# Patient Record
Sex: MALE | Race: WHITE | Employment: OTHER | URBAN - METROPOLITAN AREA
[De-identification: names, ages, dates, MRNs, and addresses within clinical notes are randomized per-mention and may not be internally consistent; named-entity substitution may affect disease eponyms.]

---

## 2019-12-04 ENCOUNTER — HOSPITAL ENCOUNTER (OUTPATIENT)
Dept: MRI IMAGING | Age: 64
Discharge: HOME | End: 2019-12-04
Payer: MEDICARE

## 2019-12-04 DIAGNOSIS — M75.101 ROTATOR CUFF SYNDROME OF RIGHT SHOULDER: ICD-10-CM

## 2019-12-04 PROCEDURE — 73221 MRI JOINT UPR EXTREM W/O DYE: CPT

## 2019-12-31 ENCOUNTER — HOSPITAL ENCOUNTER (OUTPATIENT)
Dept: PREADMISSION TESTING | Age: 64
Discharge: HOME | End: 2019-12-31
Payer: MEDICARE

## 2019-12-31 VITALS
WEIGHT: 171.25 LBS | HEART RATE: 50 BPM | SYSTOLIC BLOOD PRESSURE: 98 MMHG | TEMPERATURE: 97.8 F | DIASTOLIC BLOOD PRESSURE: 61 MMHG | HEIGHT: 65 IN | BODY MASS INDEX: 28.53 KG/M2 | RESPIRATION RATE: 18 BRPM

## 2019-12-31 LAB
ATRIAL RATE: 49 BPM
CALCULATED P AXIS, ECG09: 38 DEGREES
CALCULATED R AXIS, ECG10: 0 DEGREES
CALCULATED T AXIS, ECG11: 1 DEGREES
DIAGNOSIS, 93000: NORMAL
HGB BLD-MCNC: 14.4 G/DL (ref 12.1–17)
P-R INTERVAL, ECG05: 178 MS
Q-T INTERVAL, ECG07: 404 MS
QRS DURATION, ECG06: 82 MS
QTC CALCULATION (BEZET), ECG08: 364 MS
VENTRICULAR RATE, ECG03: 49 BPM

## 2019-12-31 PROCEDURE — 85018 HEMOGLOBIN: CPT

## 2019-12-31 PROCEDURE — 36415 COLL VENOUS BLD VENIPUNCTURE: CPT

## 2019-12-31 PROCEDURE — 93005 ELECTROCARDIOGRAM TRACING: CPT

## 2019-12-31 RX ORDER — GUAIFENESIN 100 MG/5ML
81 LIQUID (ML) ORAL
COMMUNITY

## 2019-12-31 RX ORDER — ATORVASTATIN CALCIUM 80 MG/1
80 TABLET, FILM COATED ORAL
COMMUNITY

## 2019-12-31 RX ORDER — METOPROLOL TARTRATE 100 MG/1
75 TABLET ORAL 2 TIMES DAILY
COMMUNITY

## 2019-12-31 NOTE — PERIOP NOTES
Preoperative instructions reviewed with patient. Patient given  2 bottles of CHG soap. Instructions reviewed on use of CHG soap. Patient given SSI infection FAQS sheet,   Patient was given the opportunity to ask questions on the information provided.

## 2020-01-08 ENCOUNTER — ANESTHESIA EVENT (OUTPATIENT)
Dept: SURGERY | Age: 65
End: 2020-01-08
Payer: MEDICARE

## 2020-01-08 NOTE — H&P
Subjective:   Patient ID: Guanako Puri is a 75 y.o. male. Pain Score: Data Unavailable  Chief Complaint: Follow-up of the Right Shoulder        HPI: Guanako Puri is a 75 y.o. male who returns for follow-up of his right shoulder pain. During his last visit with our office on 10/1/19, he reported he was tugging the pull cord of his lawnmower 7/2019 and felt a \"tweak\" in his right shoulder. He noted right shoulder pain that kept him awake at night. He had pain with rotator cuff strength testing on physical exam.  X-rays of the right shoulder ordered during his last visit with out office showed no significant degenerative changes. We administered a cortisone injection to his right subacromial bursa and recommended rotator cuff strengthening exercises when his pain subsided. We also discussed the option of an MRI of his right shoulder if he remained symptomatic. THe returns today to discuss the results. He reports temporary relief from the cortisone injection for approximately 1 week, however, his right shoulder pain returned.   He notes his pain was exacerbated by performing his rotator cuff strengthening exercises.          Medical History        Past Medical History:   Diagnosis Date    Coronary artery disease           Surgical History   Past Surgical History:   Procedure Laterality Date    ANGIOPLASTY        NO RELEVANT ORTHOPAEDIC SURGERIES                   Family History   Problem Relation Age of Onset    Coronary artery disease Father      Coronary artery disease Mother      Coronary artery disease Brother        @[facility]@  Review of Systems   12/10/2019     Constitutional: Unexplained: Negative  Genitourinary: Frequent Urination: Negative  HEENT: Vision Loss: Negative  Neurological: Memory Loss: Negative  Integumentary: Rash: Negative  Cardiovascular: Palpatations: Negative  Hematologic: Bruises/Bleeds Easily: Negative  Gastrointestinal: Constipation: Negative  Immunological: Seasonal Allergies: Positive  Musculoskeletal: Joint Pain: Positive  Objective:      Vitals       Vitals:     12/10/19 0756   BP: 110/69   Pulse: 78   Weight: 160 lb   Height: 5' 5\"         Constitutional:  No acute distress. Well nourished. Well developed. Psychiatric: Alert and oriented x3. Skin:  No marked skin ulcers. Neurological:  No apparent deficits     Right shoulder shows normal range of motion. There are no skin changes, rashes, deformity, or bruising. He has pain and mild weakness with resisted thumb up isolation and pain with resisted thumb down isolation, but no weakness. He has pain with resisted external rotation, but no weakness. He has normal stability. He has good distal arm musculature. He has no edema. He has good pulses, good cap refill and good sensation distally.      Left shoulder shows normal range of motion. There are no skin changes, rashes, deformity, or bruising. He has full strength. He has normal stability. He has good distal arm musculature. He has no edema. He has good pulses, good cap refill and good sensation distally.        Radiographs:           Mri Shoulder Right Without Contrast ([id number])     Result Date: 2019  [facility] - MRI SHOULDER RT WO CONT Patient: Kadeem Dennis : 1955 Date of Service: 2019 8:30:45 AM Reason For Exam: Rotator cuff syndrome of right shoulder Ordering Provider: Felicia Farooq Physician: John Smith Signing Date: 2019 10:43:20 AM EXAM: MRI SHOULDER RT WO CONT INDICATION: Rotator cuff syndrome right shoulder COMPARISON: None TECHNIQUE: Axial proton density fat-saturated; oblique coronal T1, T2 fat-saturated, and proton density fat-saturated; and oblique sagittal T2 fat-saturated MRI of the right shoulder . CONTRAST: None. FINDINGS: A.C. joint: Moderate degeneration of the acromioclavicular joint with subacromial spurring Anterior acromion process type: 2-3 Bone marrow: Within normal limits.  No acute fracture, dislocation, or marrow replacing process. Joint fluid: There is fluid in the subacromial subdeltoid bursa and glenohumeral joint. Minimal synovitis within the axillary pouch and synovitis versus small loose bodies in the subscapularis joint recess. Small amount of fluid in the subcoracoid bursa Rotator cuff tendons: Large full-thickness tear of the supraspinatus. The torn fibers are degenerated and have retracted approximately 2.2 cm. There is tendinopathy of the remaining supraspinatus and anterior infraspinatus with partial-thickness undersurface tearing. Teres minor is intact. There is focal high-grade partial-thickness undersurface tearing of the superior subscapularis Biceps tendon: Intact and located within the bicipital groove. Muscles: Mild atrophy of the supraspinatus without edema. Glenoid labrum: Intact. Glenohumeral joint capsule: within normal limits. Glenohumeral articular cartilage: Intact. No focal osteochondral lesion. Soft tissue mass: None. IMPRESSION: 1. Large full-thickness tear of the supraspinatus with 2.2 cm retraction and mild atrophy 2. Tendinopathy with partial-thickness undersurface tearing anterior infraspinatus and focal high-grade partial-thickness undersurface tearing superior subscapularis 3. Acromioclavicular degenerative change  Signing date/time: 12/4/2019 10:43 AM Signed by: Genevieve Willard V        Independently reviewed the MRI which shows evidence of a full-thickness rotator cuff tear of the supraspinatus tendon with some mild retraction. .  Assessment:      1. Traumatic complete tear of right rotator cuff, initial encounter    2. Impingement syndrome of right shoulder    3. Primary osteoarthritis of right shoulder       There is no problem list on file for this patient.     Plan:      I reviewed the results of the MRI ordered of the right shoulder with the patient. He has a full-thickness rotator cuff tear of the supraspinatus with 2.2 cm of retraction and mild atrophy.  There is also a focal full-thickness high grade partial thickness tear of the subscapularis and AC joint osteoarthritis. I discussed this diagnosis with the patient. We discussed treatment options including continued conservative management vs rotator cuff repair surgery as well as the pros and cons of each. We discussed rotator cuff surgery with the patient including the risks, benefits and possible complications. These include infection, bleeding, and failure to heal or resolve the patient's pain. We discussed the nature of the surgery including the need for general anesthesia, the usual use of a upper extremity block, and the use of arthroscopic equipment. We also discussed the possible need to make an open incision. He understands this and limitations with his arm at in the postop phase. We explained that he could not lift the arm for 6 weeks and after that would be restricted for another 6. He understands and wants to go forward. We will set him up for a right shoulder arthroscopy and rotator cuff repair with subacromial decompression and distal clavicle excision. We will also look at his intra-articular pathology and treat that accordingly. All the risks and benefits were discussed with him including infection and failure to heal and he wants to proceed with surgery. We will schedule surgery at his convenience. Follow-up for scheduled surgery.            Orders Placed This Encounter    BMI >=25 PATIENT INSTRUCTIONS & EDUCATION      Return in about 2 weeks (around 12/24/2019) for Scheduled surgery. Medical documentation was entered into the chart by me, Vu Lux, medical scribe for Dr. Boris Solorzano, Nakul Dey MD, personally, performed the services described in this documentation, as scribed in my presence, and it is both accurate and complete.   Scribed by: Vu Lux      Electronically signed by Nakul Dey MD at 12/10/2019  4:44 PM      Links     Previous Version       Office Visit on 12/10/2019 Revision History         Note shared with patient

## 2020-01-09 ENCOUNTER — ANESTHESIA (OUTPATIENT)
Dept: SURGERY | Age: 65
End: 2020-01-09
Payer: MEDICARE

## 2020-01-09 ENCOUNTER — HOSPITAL ENCOUNTER (OUTPATIENT)
Age: 65
Setting detail: OUTPATIENT SURGERY
Discharge: HOME | End: 2020-01-09
Attending: ORTHOPAEDIC SURGERY | Admitting: ORTHOPAEDIC SURGERY
Payer: MEDICARE

## 2020-01-09 VITALS
TEMPERATURE: 97.7 F | RESPIRATION RATE: 15 BRPM | SYSTOLIC BLOOD PRESSURE: 94 MMHG | DIASTOLIC BLOOD PRESSURE: 40 MMHG | OXYGEN SATURATION: 92 % | BODY MASS INDEX: 28.53 KG/M2 | WEIGHT: 171.25 LBS | HEIGHT: 65 IN | HEART RATE: 60 BPM

## 2020-01-09 DIAGNOSIS — M75.101 ROTATOR CUFF TEAR ARTHROPATHY OF RIGHT SHOULDER: Primary | ICD-10-CM

## 2020-01-09 DIAGNOSIS — M12.811 ROTATOR CUFF TEAR ARTHROPATHY OF RIGHT SHOULDER: Primary | ICD-10-CM

## 2020-01-09 PROCEDURE — 77030003601 HC NDL NRV BLK BBMI -A

## 2020-01-09 PROCEDURE — 77030006988: Performed by: ORTHOPAEDIC SURGERY

## 2020-01-09 PROCEDURE — 76210000000 HC OR PH I REC 2 TO 2.5 HR: Performed by: ORTHOPAEDIC SURGERY

## 2020-01-09 PROCEDURE — 77030011390 HC GRSP SUT IDL J&J -C: Performed by: ORTHOPAEDIC SURGERY

## 2020-01-09 PROCEDURE — 76010000131 HC OR TIME 2 TO 2.5 HR: Performed by: ORTHOPAEDIC SURGERY

## 2020-01-09 PROCEDURE — 74011250636 HC RX REV CODE- 250/636: Performed by: NURSE ANESTHETIST, CERTIFIED REGISTERED

## 2020-01-09 PROCEDURE — 76210000021 HC REC RM PH II 0.5 TO 1 HR: Performed by: ORTHOPAEDIC SURGERY

## 2020-01-09 PROCEDURE — 77030016678 HC BUR SHV4 S&N -B: Performed by: ORTHOPAEDIC SURGERY

## 2020-01-09 PROCEDURE — 77030040922 HC BLNKT HYPOTHRM STRY -A

## 2020-01-09 PROCEDURE — 77030037717 HC BIT DRL SUT TAK KT -ARTH -D: Performed by: ORTHOPAEDIC SURGERY

## 2020-01-09 PROCEDURE — 74011250636 HC RX REV CODE- 250/636: Performed by: ANESTHESIOLOGY

## 2020-01-09 PROCEDURE — 76060000035 HC ANESTHESIA 2 TO 2.5 HR: Performed by: ORTHOPAEDIC SURGERY

## 2020-01-09 PROCEDURE — 77030026438 HC STYL ET INTUB CARD -A: Performed by: NURSE ANESTHETIST, CERTIFIED REGISTERED

## 2020-01-09 PROCEDURE — C1713 ANCHOR/SCREW BN/BN,TIS/BN: HCPCS | Performed by: ORTHOPAEDIC SURGERY

## 2020-01-09 PROCEDURE — 74011000250 HC RX REV CODE- 250: Performed by: NURSE ANESTHETIST, CERTIFIED REGISTERED

## 2020-01-09 PROCEDURE — 74011000250 HC RX REV CODE- 250: Performed by: ANESTHESIOLOGY

## 2020-01-09 PROCEDURE — 77030018835 HC SOL IRR LR ICUM -A: Performed by: ORTHOPAEDIC SURGERY

## 2020-01-09 PROCEDURE — 77030019927 HC TBNG IRR CYSTO BAXT -A: Performed by: ORTHOPAEDIC SURGERY

## 2020-01-09 PROCEDURE — 77030008684 HC TU ET CUF COVD -B: Performed by: NURSE ANESTHETIST, CERTIFIED REGISTERED

## 2020-01-09 PROCEDURE — 77030002916 HC SUT ETHLN J&J -A: Performed by: ORTHOPAEDIC SURGERY

## 2020-01-09 PROCEDURE — 74011250636 HC RX REV CODE- 250/636: Performed by: PHYSICIAN ASSISTANT

## 2020-01-09 PROCEDURE — 77030006884 HC BLD SHV INCIS S&N -B: Performed by: ORTHOPAEDIC SURGERY

## 2020-01-09 RX ORDER — SODIUM CHLORIDE 0.9 % (FLUSH) 0.9 %
5-40 SYRINGE (ML) INJECTION AS NEEDED
Status: DISCONTINUED | OUTPATIENT
Start: 2020-01-09 | End: 2020-01-09 | Stop reason: HOSPADM

## 2020-01-09 RX ORDER — MORPHINE SULFATE 10 MG/ML
2 INJECTION, SOLUTION INTRAMUSCULAR; INTRAVENOUS
Status: DISCONTINUED | OUTPATIENT
Start: 2020-01-09 | End: 2020-01-09 | Stop reason: HOSPADM

## 2020-01-09 RX ORDER — FENTANYL CITRATE 50 UG/ML
INJECTION, SOLUTION INTRAMUSCULAR; INTRAVENOUS AS NEEDED
Status: DISCONTINUED | OUTPATIENT
Start: 2020-01-09 | End: 2020-01-09 | Stop reason: HOSPADM

## 2020-01-09 RX ORDER — ROPIVACAINE HYDROCHLORIDE 5 MG/ML
30 INJECTION, SOLUTION EPIDURAL; INFILTRATION; PERINEURAL AS NEEDED
Status: DISCONTINUED | OUTPATIENT
Start: 2020-01-09 | End: 2020-01-09 | Stop reason: HOSPADM

## 2020-01-09 RX ORDER — FENTANYL CITRATE 50 UG/ML
25 INJECTION, SOLUTION INTRAMUSCULAR; INTRAVENOUS
Status: DISCONTINUED | OUTPATIENT
Start: 2020-01-09 | End: 2020-01-09 | Stop reason: HOSPADM

## 2020-01-09 RX ORDER — SODIUM CHLORIDE 0.9 % (FLUSH) 0.9 %
5-40 SYRINGE (ML) INJECTION EVERY 8 HOURS
Status: DISCONTINUED | OUTPATIENT
Start: 2020-01-09 | End: 2020-01-09 | Stop reason: HOSPADM

## 2020-01-09 RX ORDER — DEXAMETHASONE SODIUM PHOSPHATE 4 MG/ML
INJECTION, SOLUTION INTRA-ARTICULAR; INTRALESIONAL; INTRAMUSCULAR; INTRAVENOUS; SOFT TISSUE AS NEEDED
Status: DISCONTINUED | OUTPATIENT
Start: 2020-01-09 | End: 2020-01-09 | Stop reason: HOSPADM

## 2020-01-09 RX ORDER — SODIUM CHLORIDE, SODIUM LACTATE, POTASSIUM CHLORIDE, CALCIUM CHLORIDE 600; 310; 30; 20 MG/100ML; MG/100ML; MG/100ML; MG/100ML
INJECTION, SOLUTION INTRAVENOUS
Status: DISCONTINUED | OUTPATIENT
Start: 2020-01-09 | End: 2020-01-09 | Stop reason: HOSPADM

## 2020-01-09 RX ORDER — SODIUM CHLORIDE 9 MG/ML
INJECTION INTRAMUSCULAR; INTRAVENOUS; SUBCUTANEOUS
Status: DISCONTINUED
Start: 2020-01-09 | End: 2020-01-09 | Stop reason: HOSPADM

## 2020-01-09 RX ORDER — HYDROMORPHONE HYDROCHLORIDE 1 MG/ML
0.2 INJECTION, SOLUTION INTRAMUSCULAR; INTRAVENOUS; SUBCUTANEOUS
Status: DISCONTINUED | OUTPATIENT
Start: 2020-01-09 | End: 2020-01-09 | Stop reason: HOSPADM

## 2020-01-09 RX ORDER — EPHEDRINE SULFATE/0.9% NACL/PF 50 MG/5 ML
SYRINGE (ML) INTRAVENOUS AS NEEDED
Status: DISCONTINUED | OUTPATIENT
Start: 2020-01-09 | End: 2020-01-09 | Stop reason: HOSPADM

## 2020-01-09 RX ORDER — FENTANYL CITRATE 50 UG/ML
50 INJECTION, SOLUTION INTRAMUSCULAR; INTRAVENOUS AS NEEDED
Status: DISCONTINUED | OUTPATIENT
Start: 2020-01-09 | End: 2020-01-09 | Stop reason: HOSPADM

## 2020-01-09 RX ORDER — PROCHLORPERAZINE EDISYLATE 5 MG/ML
INJECTION INTRAMUSCULAR; INTRAVENOUS
Status: DISCONTINUED
Start: 2020-01-09 | End: 2020-01-09 | Stop reason: HOSPADM

## 2020-01-09 RX ORDER — ONDANSETRON 2 MG/ML
INJECTION INTRAMUSCULAR; INTRAVENOUS AS NEEDED
Status: DISCONTINUED | OUTPATIENT
Start: 2020-01-09 | End: 2020-01-09 | Stop reason: HOSPADM

## 2020-01-09 RX ORDER — GLYCOPYRROLATE 0.2 MG/ML
INJECTION INTRAMUSCULAR; INTRAVENOUS AS NEEDED
Status: DISCONTINUED | OUTPATIENT
Start: 2020-01-09 | End: 2020-01-09 | Stop reason: HOSPADM

## 2020-01-09 RX ORDER — ROPIVACAINE HYDROCHLORIDE 5 MG/ML
INJECTION, SOLUTION EPIDURAL; INFILTRATION; PERINEURAL
Status: COMPLETED | OUTPATIENT
Start: 2020-01-09 | End: 2020-01-09

## 2020-01-09 RX ORDER — SODIUM CHLORIDE, SODIUM LACTATE, POTASSIUM CHLORIDE, CALCIUM CHLORIDE 600; 310; 30; 20 MG/100ML; MG/100ML; MG/100ML; MG/100ML
50 INJECTION, SOLUTION INTRAVENOUS CONTINUOUS
Status: DISCONTINUED | OUTPATIENT
Start: 2020-01-09 | End: 2020-01-09 | Stop reason: HOSPADM

## 2020-01-09 RX ORDER — ROCURONIUM BROMIDE 10 MG/ML
INJECTION, SOLUTION INTRAVENOUS AS NEEDED
Status: DISCONTINUED | OUTPATIENT
Start: 2020-01-09 | End: 2020-01-09 | Stop reason: HOSPADM

## 2020-01-09 RX ORDER — LIDOCAINE HYDROCHLORIDE 10 MG/ML
0.1 INJECTION, SOLUTION EPIDURAL; INFILTRATION; INTRACAUDAL; PERINEURAL AS NEEDED
Status: DISCONTINUED | OUTPATIENT
Start: 2020-01-09 | End: 2020-01-09 | Stop reason: HOSPADM

## 2020-01-09 RX ORDER — LIDOCAINE HYDROCHLORIDE 20 MG/ML
INJECTION, SOLUTION EPIDURAL; INFILTRATION; INTRACAUDAL; PERINEURAL AS NEEDED
Status: DISCONTINUED | OUTPATIENT
Start: 2020-01-09 | End: 2020-01-09 | Stop reason: HOSPADM

## 2020-01-09 RX ORDER — MIDAZOLAM HYDROCHLORIDE 1 MG/ML
INJECTION, SOLUTION INTRAMUSCULAR; INTRAVENOUS AS NEEDED
Status: DISCONTINUED | OUTPATIENT
Start: 2020-01-09 | End: 2020-01-09 | Stop reason: HOSPADM

## 2020-01-09 RX ORDER — NEOSTIGMINE METHYLSULFATE 1 MG/ML
INJECTION INTRAVENOUS AS NEEDED
Status: DISCONTINUED | OUTPATIENT
Start: 2020-01-09 | End: 2020-01-09 | Stop reason: HOSPADM

## 2020-01-09 RX ORDER — MIDAZOLAM HYDROCHLORIDE 1 MG/ML
1 INJECTION, SOLUTION INTRAMUSCULAR; INTRAVENOUS AS NEEDED
Status: DISCONTINUED | OUTPATIENT
Start: 2020-01-09 | End: 2020-01-09 | Stop reason: HOSPADM

## 2020-01-09 RX ORDER — OXYCODONE AND ACETAMINOPHEN 5; 325 MG/1; MG/1
1 TABLET ORAL
Qty: 42 TAB | Refills: 0 | Status: SHIPPED | OUTPATIENT
Start: 2020-01-09 | End: 2020-01-23

## 2020-01-09 RX ORDER — CEFAZOLIN SODIUM/WATER 2 G/20 ML
2 SYRINGE (ML) INTRAVENOUS ONCE
Status: COMPLETED | OUTPATIENT
Start: 2020-01-09 | End: 2020-01-09

## 2020-01-09 RX ORDER — ONDANSETRON 2 MG/ML
4 INJECTION INTRAMUSCULAR; INTRAVENOUS AS NEEDED
Status: DISCONTINUED | OUTPATIENT
Start: 2020-01-09 | End: 2020-01-09 | Stop reason: HOSPADM

## 2020-01-09 RX ORDER — PROPOFOL 10 MG/ML
INJECTION, EMULSION INTRAVENOUS AS NEEDED
Status: DISCONTINUED | OUTPATIENT
Start: 2020-01-09 | End: 2020-01-09 | Stop reason: HOSPADM

## 2020-01-09 RX ORDER — DEXMEDETOMIDINE HYDROCHLORIDE 100 UG/ML
INJECTION, SOLUTION INTRAVENOUS AS NEEDED
Status: DISCONTINUED | OUTPATIENT
Start: 2020-01-09 | End: 2020-01-09 | Stop reason: HOSPADM

## 2020-01-09 RX ADMIN — Medication 10 MG: at 11:11

## 2020-01-09 RX ADMIN — FENTANYL CITRATE 100 MCG: 50 INJECTION, SOLUTION INTRAMUSCULAR; INTRAVENOUS at 09:36

## 2020-01-09 RX ADMIN — SODIUM CHLORIDE 5 MG: 9 INJECTION INTRAMUSCULAR; INTRAVENOUS; SUBCUTANEOUS at 14:33

## 2020-01-09 RX ADMIN — Medication 2000 MG: at 10:39

## 2020-01-09 RX ADMIN — ROCURONIUM BROMIDE 50 MG: 10 SOLUTION INTRAVENOUS at 10:31

## 2020-01-09 RX ADMIN — LIDOCAINE HYDROCHLORIDE 100 MG: 20 INJECTION, SOLUTION EPIDURAL; INFILTRATION; INTRACAUDAL; PERINEURAL at 10:31

## 2020-01-09 RX ADMIN — FENTANYL CITRATE 100 MCG: 50 INJECTION, SOLUTION INTRAMUSCULAR; INTRAVENOUS at 10:31

## 2020-01-09 RX ADMIN — SODIUM CHLORIDE, POTASSIUM CHLORIDE, SODIUM LACTATE AND CALCIUM CHLORIDE: 600; 310; 30; 20 INJECTION, SOLUTION INTRAVENOUS at 10:17

## 2020-01-09 RX ADMIN — NEOSTIGMINE METHYLSULFATE 3 MG: 1 INJECTION, SOLUTION INTRAMUSCULAR; INTRAVENOUS; SUBCUTANEOUS at 12:25

## 2020-01-09 RX ADMIN — DEXMEDETOMIDINE HYDROCHLORIDE 10 MCG: 100 INJECTION, SOLUTION, CONCENTRATE INTRAVENOUS at 10:54

## 2020-01-09 RX ADMIN — GLYCOPYRROLATE 0.6 MG: 0.2 INJECTION, SOLUTION INTRAMUSCULAR; INTRAVENOUS at 12:25

## 2020-01-09 RX ADMIN — ONDANSETRON 4 MG: 2 INJECTION INTRAMUSCULAR; INTRAVENOUS at 13:38

## 2020-01-09 RX ADMIN — ONDANSETRON HYDROCHLORIDE 4 MG: 2 INJECTION, SOLUTION INTRAMUSCULAR; INTRAVENOUS at 12:25

## 2020-01-09 RX ADMIN — ROPIVACAINE HYDROCHLORIDE 30 ML: 5 INJECTION, SOLUTION EPIDURAL; INFILTRATION; PERINEURAL at 09:27

## 2020-01-09 RX ADMIN — SODIUM CHLORIDE, SODIUM LACTATE, POTASSIUM CHLORIDE, AND CALCIUM CHLORIDE 50 ML/HR: 600; 310; 30; 20 INJECTION, SOLUTION INTRAVENOUS at 09:25

## 2020-01-09 RX ADMIN — Medication 10 MG: at 11:25

## 2020-01-09 RX ADMIN — DEXAMETHASONE SODIUM PHOSPHATE 8 MG: 4 INJECTION, SOLUTION INTRAMUSCULAR; INTRAVENOUS at 10:36

## 2020-01-09 RX ADMIN — PROPOFOL 150 MG: 10 INJECTION, EMULSION INTRAVENOUS at 10:31

## 2020-01-09 RX ADMIN — DEXMEDETOMIDINE HYDROCHLORIDE 1 MCG: 100 INJECTION, SOLUTION, CONCENTRATE INTRAVENOUS at 11:08

## 2020-01-09 RX ADMIN — MIDAZOLAM 2 MG: 1 INJECTION INTRAMUSCULAR; INTRAVENOUS at 10:26

## 2020-01-09 RX ADMIN — MIDAZOLAM 2 MG: 1 INJECTION INTRAMUSCULAR; INTRAVENOUS at 09:36

## 2020-01-09 NOTE — ANESTHESIA POSTPROCEDURE EVALUATION
Post-Anesthesia Evaluation and Assessment    Patient: Beatrice Juarez MRN: [id number]  SSN: [id number]    YOB: 1955  Age: 77 y.o. Sex: male      I have evaluated the patient and they are stable and ready for discharge from the PACU. Cardiovascular Function/Vital Signs  Visit Vitals  /50   Pulse 67   Temp 36.5 °C (97.7 °F)   Resp 15   Ht 5' 5\" (1.651 m)   Wt 77.7 kg (171 lb 4 oz)   SpO2 99%   BMI 28.50 kg/m²       Patient is status post General anesthesia for Procedure(s):  RIGHT SHOULDER ARTHROSCOPY, SUBACROMIAL DECOMPRESSION WITH DISTAL CLAVICLE EXCISION AND ROTATOR CUFF REPAIR. Nausea/Vomiting: None    Postoperative hydration reviewed and adequate. Pain:  Pain Scale 1: (P) Numeric (0 - 10) (01/09/20 1239)  Pain Intensity 1: (P) 0 (01/09/20 1239)   Managed    Neurological Status:   Neuro (WDL): Within Defined Limits (01/09/20 0902)  Neuro  LUE Motor Response: (P) Purposeful (01/09/20 1239)  LLE Motor Response: (P) Purposeful (01/09/20 1239)  RUE Motor Response: (P) Pharmocologically paralyzed (01/09/20 1239)  RLE Motor Response: (P) Purposeful (01/09/20 1239)   At baseline    Mental Status, Level of Consciousness: Alert and  oriented to person, place, and time    Pulmonary Status:   O2 Device: CO2 nasal cannula (01/09/20 1243)   Adequate oxygenation and airway patent    Complications related to anesthesia: None    Post-anesthesia assessment completed. No concerns    Signed By: Uche Shah MD     January 9, 2020              Procedure(s):  RIGHT SHOULDER ARTHROSCOPY, SUBACROMIAL DECOMPRESSION WITH DISTAL CLAVICLE EXCISION AND ROTATOR CUFF REPAIR. general, regional    <BSHSIANPOST>    Vitals Value Taken Time   /50 1/9/2020  1:00 PM   Temp 36.5 °C (97.7 °F) 1/9/2020 12:43 PM   Pulse 62 1/9/2020  1:06 PM   Resp 15 1/9/2020  1:06 PM   SpO2 97 % 1/9/2020  1:06 PM   Vitals shown include unvalidated device data.

## 2020-01-09 NOTE — ROUTINE PROCESS
Patient: Vanessa Hoang MRN: [id number]  SSN: [id number]   YOB: 1955  Age: 77 y.o. Sex: male     Patient is status post Procedure(s):  RIGHT SHOULDER ARTHROSCOPY, SUBACROMIAL DECOMPRESSION WITH DISTAL CLAVICLE EXCISION AND ROTATOR CUFF REPAIR. Surgeon(s) and Role:     Tay Sylvester MD (Jody) - Primary     * Collette Gaucher, DO - Fellow    Local/Dose/Irrigation:                    Peripheral IV 01/09/20 Left;Posterior Hand (Active)   Site Assessment Clean, dry, & intact 1/9/2020  9:48 AM   Phlebitis Assessment 0 1/9/2020  9:48 AM   Infiltration Assessment 0 1/9/2020  9:48 AM   Dressing Status Clean, dry, & intact; New 1/9/2020  9:48 AM   Dressing Type Tape;Transparent 1/9/2020  9:48 AM   Hub Color/Line Status Green; Infusing 1/9/2020  9:48 AM                           Dressing/Packing:  Wound Shoulder Right-Dressing Type: (4x4, abd and tape) (01/09/20 1100)    Splint/Cast: Wound Shoulder Right-Splint Type/Material: (shoulder immobilizer)]    Other:

## 2020-01-09 NOTE — DISCHARGE INSTRUCTIONS
[facility]    POST OPERATIVE INSTRUCTIONS  Rotator Cuff Repair - Shoulder  MD Gi Mckay PA-C           1. First meal at home should be clear liquids. Progress to regular diet as tolerated. 2. Apply an ice bag or use cold therapy device for 20-30 minutes 4 times a day for the first 10 days to reduce swelling and pain and then use as desired. 3. You may remove the bulky dressing 28 hours after surgery and at that time it is ok to shower. Dry incisions well after showering and  cover with Bandaids. 4. You will use a sling with a pillow until your first post-op visit. You may remove the sling to shower- keeping your arm down by your side, but shoulder         Wear the sling at all other times. 5. A recliner position is often more comfortable for sleeping the first several days/ weeks after surgery but you may sleep however you are most comfortable with the sling on. 6. Medication Instructions are listed below:          All narcotics can cause constipation, so please be aware and drink plenty of  water and take over the counter stool softner or Miralax. Oxycodone      7. A post operative appointment has been scheduled for you. __1/22 /20 at 1:20pm with Pretty Han PA-C _Please  call the office if you need to change the date or time of your appointment. 8. If you develop a fever greater than 101, unexpected redness, or swelling in your arm please call the office . Some bleeding or drainage should be expected the first few days after surgery. 9. Thank you for following the above instructions . Please call the office if you have questions and the  will put you in touch with one of our team members.    [phone]       ______________________________________________________________________    Anesthesia Discharge Instructions    After general anesthesia or intervenous sedation, for 24 hours or while taking prescription Narcotics:  · Limit your activities  · Do not drive or operate hazardous machinery  · If you have not urinated within 8 hours after discharge, please contact your surgeon on call. · Do not make important personal or business decisions  · Do not drink alcoholic beverages    Report the following to your surgeon:  · Excessive pain, swelling, redness or odor of or around the surgical area  · Temperature over 100.5 degrees  · Nausea and vomiting lasting longer than 4 hours or if unable to take medication  · Any signs of decreased circulation or nerve impairment to extremity:  Change in color, persistent numbness, tingling, coldness or increased pain.   · Any questions

## 2020-01-09 NOTE — ANESTHESIA PREPROCEDURE EVALUATION
Relevant Problems   No relevant active problems       Anesthetic History   No history of anesthetic complications            Review of Systems / Medical History  Patient summary reviewed, nursing notes reviewed and pertinent labs reviewed    Pulmonary  Within defined limits                 Neuro/Psych   Within defined limits           Cardiovascular              CAD         GI/Hepatic/Renal                Endo/Other             Other Findings              Physical Exam    Airway  Mallampati: I  TM Distance: > 6 cm  Neck ROM: normal range of motion   Mouth opening: Normal     Cardiovascular    Rhythm: regular  Rate: normal         Dental  No notable dental hx       Pulmonary  Breath sounds clear to auscultation               Abdominal         Other Findings            Anesthetic Plan    ASA: 2  Anesthesia type: general and regional - supraclavicular block          Induction: Intravenous  Anesthetic plan and risks discussed with: Patient

## 2020-01-09 NOTE — ANESTHESIA PROCEDURE NOTES
Peripheral Block    Start time: 1/9/2020 9:30 AM  End time: 1/9/2020 9:40 AM  Performed by: Shahab Delgadillo MD  Authorized by: Shahab Delgadillo MD       Pre-procedure:    Indications: at surgeon's request and post-op pain management    Preanesthetic Checklist: patient identified, risks and benefits discussed, site marked, timeout performed, anesthesia consent given and patient being monitored    Timeout Time: 09:30          Block Type:   Block Type:  Supraclavicular  Laterality:  Right  Monitoring:  Oxygen, responsive to questions, standard ASA monitoring, continuous pulse ox, frequent vital sign checks and heart rate  Injection Technique:  Single shot  Procedures: ultrasound guided    Patient Position: supine  Prep: chlorhexidine    Location:  Supraclavicular  Needle Type:  Stimuplex  Needle Gauge:  21 G  Needle Localization:  Ultrasound guidance    Assessment:  Number of attempts:  1  Injection Assessment:  Ultrasound image on chart, no paresthesia, negative aspiration for CSF, incremental injection every 5 mL, no intravascular symptoms, negative aspiration for blood, local visualized surrounding nerve on ultrasound and low pressure verified by pressure monitor  Patient tolerance:  Patient tolerated the procedure well with no immediate complications

## 2020-01-10 NOTE — OP NOTES
Shoulder Arthroscopy Operative Note      Patient: Vanessa Hoang MRN: [id number]  SSN: [id number]    YOB: 1955  Age: 77 y.o. Sex: male        Date of Surgery: 1/9/2020    Preoperative Diagnosis:    RIGHT ROTATOR CUFF TEAR WITH IMPINGEMENT AND AC DJD    Postoperative Diagnosis:   1. Right Shoulder Impingement      2. AC arthritis      3. Rotator cuff tear      4. Degenerative Labral Tear    Procedures: 1. Right Shoulder Scope and Sub-Acromial Decompression   2. Arthroscopic Distal Clavicle Excision   3. Arthroscopic Rotator Cuff Repair  4. Extensive Debridement of Labrum, Subscap, and Subacromial Space. Surgeon(s) and Role:     Tay Sylvester MD (Jody) - Primary    First Assistant: Fabien Magana PA-C    Second Assistant:  Collette Gaucher, DO     Anesthesia: General    Pathology: Impingement and AC Arthritis    Estimated Blood Loss:  < 20 ml      Specimens: * No specimens in log *     Condition: Stable    Complications: None         Indications: The patient is a 77 y.o. male who has right shoulder impingement and AC arthritis and a rotator cuff tear. The patient has exhausted nonoperative modalities and is electively admitted for outpatient right shoulder arthroscopy. PROCEDURE IN DETAIL: After the procedure was explained to the patient, including the risks, benefits and possible complications, the patient signed the informed consent. The patient was then taken to the operating suite. Following administration of general anesthesia and interscalene block for postoperative pain control and infusion of intravenous antibiotic, the patient was positioned on the operating table in the supine fashion. The  right  shoulder was then examined under anesthesia and noted to be stable through full range of motion. At this point, the patient was then carefully positioned in the lateral decubitus position, left side down. The axillary roll was placed. Care was taken to pad both the upper and lower extremities.  The right arm was then placed in the Kapture traction device in 45 degrees abduction and 30 degrees forward flexion with 10 pounds of traction. The right shoulder was then prepped and draped in the sterile fashion. The scope was introduced into the shoulder and diagnostic arthroscopy commenced. Articular surfaces of the humeral head and glenoid were visualized and noted to be intact. The anterior, posterior, superior and inferior labrum were visualized. There was moderate degenerative frayiong of the labrum. The biceps anchor and the biceps itself was intact. The labrum was carefully debrided using a sucker shaver back to stable tissue. The biceps insertion was probed and found to be intact. The subscap was frayed but also intact. This was also carefully debrided with sucker shaver. The undersurface of the rotator cuff was then visualized. There was a large tear of the supraspinatus. The undersurface of the rotator cuff was also carefully debrided. The scope was introduced into the subacromial space. An anterior portal was created for inflow and  lateral portal was established for debridement using a spinal needle for localization. Hypertrophic hemorrhagic bursal tissue was then resected. The amount of bursa was pricing given the size of the tear. An extensive amount of time was spent removing the bursa to expose the underlying rotator cuff. The bursal side of the cuff was visualized and was torn across the entire supraspinatus. The underside of the acromion was identified and denuded of all soft tissue. An acromioplasty was then performed from the posterior portal using a helicut sandrine with the cutting block technique. The Acromion was shaved down to a type one acromion to remove all impingement. At this point the distal clavicle was identified and an arthroscopic distal clavicle resection was then performed. The distal 10mm of distal clavicle was then resected.  Care was taken to preserve the posterior/superior capsule. The scope was introduced back into the subacromial space. An arthroscopic rotator cuff repair was then performed utilizing   Three triple loaded all suture Arthrex  anchors . The triple loaded anchors were placed laterally and one limb of each suture was placed through the lateral cuff in simple fashion. All of the sutures were tied with rodeur knots to appose the cuff to the tuberosity. The anterior-most suture was passed in a mattress fashion across the interval to insure good closure of the defect. This yielded an excellent cuff repair on the bursal side. At this point, with the procedure complete, all arthroscopic equipment was removed from the shoulder. The portals were reapproximated using 2-0 nylon sutures. A sterile dressing was applied. The Sling/immobilzer was applied. The patient was then transferred to the Recovery Room in stable condition. Camilo Saldivar was present for the entirety of the case and was essential in its completion. She assisted with arm positioning and suture management during the repair as well as controlling the scope as the surgeon completed other tasks such as suture passage and knot tying that required two hands to do. Signed: Tay Duke MD (1/9/2020 at 10:51 AM)

## (undated) DEVICE — DRAPE,EXTREMITY,89X128,STERILE: Brand: MEDLINE

## (undated) DEVICE — 4-PORT MANIFOLD: Brand: NEPTUNE 2

## (undated) DEVICE — SHOULDER W/POUCH II-LF: Brand: MEDLINE INDUSTRIES, INC.

## (undated) DEVICE — PREP SKN PREVAIL 40ML APPL --

## (undated) DEVICE — 4.5 MM HELICUT STRAIGHT BURRS,                                    POWER/EP-1, SLATE, 5000 MAXIMUM RPM,                                    PACKAGED 6 PER BOX, STERILE: Brand: DYONICS HELICUT

## (undated) DEVICE — SOLUTION IRRIG 3000ML LAC R FLX CONT

## (undated) DEVICE — INTENDED FOR TISSUE SEPARATION, AND OTHER PROCEDURES THAT REQUIRE A SHARP SURGICAL BLADE TO PUNCTURE OR CUT.: Brand: BARD-PARKER ® CARBON RIB-BACK BLADES

## (undated) DEVICE — STERILE POLYISOPRENE POWDER-FREE SURGICAL GLOVES WITH EMOLLIENT COATING: Brand: PROTEXIS

## (undated) DEVICE — STRIP,CLOSURE,WOUND,MEDI-STRIP,1/2X4: Brand: MEDLINE

## (undated) DEVICE — Y-TYPE TUR/BLADDER IRRIGATION SET, REGULATING CLAMP

## (undated) DEVICE — INFECTION CONTROL KIT SYS

## (undated) DEVICE — GRASPER SUT 60DEG SHRP TIP LO PROF FOR RAP ACCS IN SHLDR

## (undated) DEVICE — STERILE POLYISOPRENE POWDER-FREE SURGICAL GLOVES: Brand: PROTEXIS

## (undated) DEVICE — STRAP,POSITIONING,KNEE/BODY,FOAM,4X60": Brand: MEDLINE

## (undated) DEVICE — SHOULDER SUSPENSION KIT 6 PER BOX

## (undated) DEVICE — SUT ETHLN 3-0 18IN PS1 BLK --

## (undated) DEVICE — 4.5 MM INCISOR STRAIGHT BLADES,                                    POWER/EP-1, LIME GREEN, PACKAGED 6                                    PER BOX, STERILE

## (undated) DEVICE — ASPIRATOR FLR L72IN SUCT TB W/ 1 DETACH FISH STK PUSH HNDL

## (undated) DEVICE — 5.0 MM I.D. UNIVERSAL CANNULA, 76                                    MM LONG, BLUE, LATEX SEAL,                                    SINGLE-USE STERILE PACKS, BOX OF 10.                                    INCLUDES OBTURATOR AND TROCAR